# Patient Record
Sex: MALE | URBAN - METROPOLITAN AREA
[De-identification: names, ages, dates, MRNs, and addresses within clinical notes are randomized per-mention and may not be internally consistent; named-entity substitution may affect disease eponyms.]

---

## 2017-11-16 ENCOUNTER — TRANSFERRED RECORDS (OUTPATIENT)
Dept: HEALTH INFORMATION MANAGEMENT | Facility: CLINIC | Age: 37
End: 2017-11-16

## 2017-12-06 ENCOUNTER — TRANSFERRED RECORDS (OUTPATIENT)
Dept: HEALTH INFORMATION MANAGEMENT | Facility: CLINIC | Age: 37
End: 2017-12-06

## 2017-12-15 ENCOUNTER — TRANSFERRED RECORDS (OUTPATIENT)
Dept: HEALTH INFORMATION MANAGEMENT | Facility: CLINIC | Age: 37
End: 2017-12-15

## 2018-01-05 ENCOUNTER — TRANSFERRED RECORDS (OUTPATIENT)
Dept: HEALTH INFORMATION MANAGEMENT | Facility: CLINIC | Age: 38
End: 2018-01-05

## 2018-01-11 ENCOUNTER — TRANSFERRED RECORDS (OUTPATIENT)
Dept: HEALTH INFORMATION MANAGEMENT | Facility: CLINIC | Age: 38
End: 2018-01-11

## 2018-01-23 PROCEDURE — 00000346 ZZHCL STATISTIC REVIEW OUTSIDE SLIDES TC 88321: Performed by: INTERNAL MEDICINE

## 2018-01-23 PROCEDURE — 00000345 ZZHCL STATISTIC REV BONE MARROW OUTSIDE SLIDES TC 88321: Performed by: INTERNAL MEDICINE

## 2018-01-25 LAB — COPATH REPORT: NORMAL

## 2018-01-26 LAB — COPATH REPORT: NORMAL

## 2018-01-31 ENCOUNTER — OFFICE VISIT (OUTPATIENT)
Dept: TRANSPLANT | Facility: CLINIC | Age: 38
End: 2018-01-31
Attending: INTERNAL MEDICINE
Payer: COMMERCIAL

## 2018-01-31 VITALS
HEART RATE: 78 BPM | WEIGHT: 237.6 LBS | SYSTOLIC BLOOD PRESSURE: 131 MMHG | DIASTOLIC BLOOD PRESSURE: 79 MMHG | RESPIRATION RATE: 18 BRPM | OXYGEN SATURATION: 98 %

## 2018-01-31 DIAGNOSIS — D47.02 INDOLENT SYSTEMIC MASTOCYTOSIS: Primary | ICD-10-CM

## 2018-01-31 PROCEDURE — G0463 HOSPITAL OUTPT CLINIC VISIT: HCPCS

## 2018-01-31 RX ORDER — LORAZEPAM 0.5 MG/1
0.5 TABLET ORAL
COMMUNITY
Start: 2017-12-29

## 2018-01-31 RX ORDER — EPINEPHRINE 0.3 MG/.3ML
0.3 INJECTION SUBCUTANEOUS
COMMUNITY
Start: 2018-01-08

## 2018-01-31 ASSESSMENT — PAIN SCALES - GENERAL: PAINLEVEL: NO PAIN (0)

## 2018-01-31 NOTE — NURSING NOTE
Oncology Rooming Note    January 31, 2018 2:22 PM   Aldair Gorman is a 37 year old male who presents for:    Chief Complaint   Patient presents with     Oncology Clinic Visit     Pt is here for Mastocytosis-here for Eval.     Initial Vitals: /79 (BP Location: Right arm, Patient Position: Right side, Cuff Size: Adult Large)  Pulse 78  Resp 18  Wt 107.8 kg (237 lb 9.6 oz)  SpO2 98% There is no height or weight on file to calculate BMI. There is no height or weight on file to calculate BSA.  No Pain (0) Comment: Data Unavailable   No LMP for male patient.  Allergies reviewed: Yes  Medications reviewed: Yes    Medications: Medication refills not needed today.  Pharmacy name entered into EPIC: Data Unavailable    Clinical concerns: none     6 minutes for nursing intake (face to face time)     Vilma Holman MA

## 2018-01-31 NOTE — PROGRESS NOTES
He came today with his wife who is referred by Dr. Elias for a second opinion for systemic mastocytosis.      HISTORY OF PRESENT ILLNESS:  Mr. Gorman is a very pleasant person who had abdominal pain somewhat related to food which prompted some imaging showing that he had gallbladder stones and he underwent surgery, cholecystectomy at the same time.  They noticed that he had skin lesions in the flanks as well as upper thighs.  Although it was noticed and prompted a Dermatology consult and biopsy last year, he has had these lesions for 15 years.  He notes that it does not disappear but the density does not increase significantly over time either.      The other symptom that has bothered him is migrating arthralgias, perhaps arthritis because some redness comes.  It is randomly occurring, no association with foods or any outside insult that he can tell, but it starts from his wrist and goes to knee and to the toes.  Sometimes it happens 3 episodes in a month.  Sometimes he has no episodes for 3-6 months.      He denies any allergic reactions, although he notes severe nausea after morphine.  He denies any episodes of anaphylaxis and no anaphylactoid reactions.      He denied nausea, vomiting, diarrhea, constipation, fevers, bone pain, chest pain and tachycardia.      PAST MEDICAL HISTORY:  Positive for cholelithiasis status post cholecystectomy, right upper superficial vein cauterizations.      SOCIAL HISTORY:  He is .  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  He denies alcohol, ETOH abuse.      FAMILY HISTORY:  He has 1 sister.  His family has hypertension and an uncle had pancreatic cancer.      PHYSICAL EXAMINATION:   GENERAL:  He is alert, oriented, in no acute distress.   HEENT:  Sclerae were anicteric.  Conjunctivae were not pale.   LUNGS:  Sounds were clear to auscultation.   HEART:  Sounds were regular and rhythmic.   ABDOMEN:  Soft.  Not tender.    EXTREMITIES:  There was no pretibial edema.  No arthritis at this point.     SKIN:  He has a significant rash consistent with urticaria pigmentosa in the flanks and he notes in the upper thighs.  I did look at the upper thighs in this visit,      LABORATORY DATA:  I did not order any labs today, but there were labs done on 12/06/2017, white blood cell 7.4, hemoglobin 15.3, platelets 260,000.  Neutrophils 64%, lymphocytes 23%, monocytes 8.6, eosinophils 2.5, basophils 0.8%.  Sodium 140, potassium 4.1, bicarbonate is 25, BUN 11, creatinine 0.8, calcium 9.8, glucose 92, total protein 7.6, albumin 4, alkaline phosphatase is 114.  AST and ALT are normal 20, 16, total bilirubin 1.3,  tryptase 69.        Bone marrow biopsy done and also was seen by my hematopathology here and read as normal cellular bone marrow 60%, systemic mastocytosis occupying 10-15% of the marrow, mast cells shows aberrant CD25 expiration, no evidence of dysplasia, lymphoma, or other cancer.  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  His karyotype was 46, XY.  He did have also a skin biopsy.  The skin biopsies showed an increased number of abnormal mast cells perivascularly and diffusing the papillary dermis, occasional perivascular eosinophils are also appreciated.  Properly controlled immunostains were performed,  stain highlights the mast cells, CD2 and 25 appear to highlight background T-cells.  No aberrant expressions.      ASSESSMENT AND PLAN:  I discussed that he has systemic mastocytosis by WHO definition KIT mutation positivity mast cell aggregates and aberrant CD2 positivity fulfills the criteria.      I explained that systemic mastocytosis in him, also stated that with cutaneous mastocytosis, urticaria pigmentosa is expected.      The most important thing after diagnosis of systemic mastocytosis is to differ if the patient has indolent or aggressive systemic mastocytosis.  In terms of  advanced systemic mastocytosis, patients can have aggressive systemic mastocytosis, SM, with associated hematologic neoplasm or mast cell leukemia.  Given findings (organ dysfunction evidence), it is very important to differ patients with aggressive mastocytosis from indolent mastocytosis.  Apparently he does not have any C findings at this stage (no liver abnormalities, pancytopenia, malabsorption, hypoalbuminemia, GI tract problems, or portal hypertension as far as we can tell).  This is very consistent with how healthy he is, so I reassured them that this is an indolent disease and will not cause shortening in his life expectancy.  However, he may have mast cell granular release syndromes, which can be allergic reactions and anaphylaxis.  It also can affect his bones, so I recommended him additional:    1. Bone DEXA scan.   2.  Bone survey to make sure that he has lytic or sclerotic lesions.   3.  Magnetic resonance elastogram of liver.  I explained the rational of disease that sometimes patients can have normal liver function tests; however, their liver can be infiltrated by mast cells and in particularly they may have fibrosis.  An MR elastogram is a new method of looking at liver fibrosis in these patients.      In summary, he has indolent systemic mastocytosis.  The additional bone and liver tests done, he should be followed every 3-6 months with a CBC, every 2 years or so a bone marrow biopsies.  Again the most important thing is to reassure him that this is an indolent disease and there is no therapy to intervene mast cell growth at this stage because of those medications cause more problems than without therapy.  However, the research has been improving in these patient, it is possible that in the coming years we will have studies and in fact maybe therapies for indolent SM patients as well.      I did not recommend him to get therapy for cutaneous mastocytosis.  However, with cutaneous lesions, I told him  that his disease will worsen with time because we are not treating it.      Anthistemis can be used, in particular I am curious if he used Zantac and Claritin together if he has arthralgias if this is caused by mast cell degranulation, although it is unusual, so he needs to make sure that the migrating arthralgia is not caused by another autoimmune disorder.      It is very important for him to carry an EpiPen all the time because anaphylactic reactions can occur.  Therefore, I recommended him to keep his Allergy appointments.        cc:  Faith Elias MD.

## 2018-01-31 NOTE — MR AVS SNAPSHOT
After Visit Summary   1/31/2018    Aldair Gorman    MRN: [id number]           Patient Information     Date Of Birth          1980        Visit Information        Provider Department      1/31/2018 2:00 PM Ok Rico MD [facility] Blood and Marrow Transplant        Today's Diagnoses     Indolent systemic mastocytosis    -  1          [facility] ([facility] [place])  [address] [place] [id number]  Phone: [phone]  Clinic Hours:   Monday-Thursday:7am to 7pm   Friday: 7am to 5pm   Weekends and holidays:    8am to noon (in general)  If your fever is 100.5  or greater,   call the clinic.  After hours call the   hospital at [phone] or   [phone]. Ask for the BMT   fellow on-call            Follow-ups after your visit        Who to contact     If you have questions or need follow up information about today's clinic visit or your schedule please contact [facility] directly at [phone].  Normal or non-critical lab and imaging results will be communicated to you by [employer], letter or phone within 4 business days after the clinic has received the results. If you do not hear from us within 7 days, please contact the clinic through [employer] or phone. If you have a critical or abnormal lab result, we will notify you by phone as soon as possible.  Submit refill requests through [employer] or call your pharmacy and they will forward the refill request to us. Please allow 3 business days for your refill to be completed.          Additional Information About Your Visit        [employer] Information     [employer] gives you secure access to your electronic health record. If you see a primary care provider, you can also send messages to your care team and make appointments. If you have questions, please call your primary care clinic.  If you do not have a primary care provider, please call [phone] and they will assist you.        Care EveryWhere ID     This  is your Care EveryWhere ID. This could be used by other organizations to access your [place] medical records  [id number]        Your Vitals Were     Pulse Respirations Pulse Oximetry             78 18 98%          Blood Pressure from Last 3 Encounters:   01/31/18 131/79    Weight from Last 3 Encounters:   01/31/18 107.8 kg (237 lb 9.6 oz)              Today, you had the following     No orders found for display       Recent Review Flowsheet Data     There is no flowsheet data to display.               Primary Care Provider Office Phone # Fax #    Tee Tello [phone]       [address] [place] [address]        Equal Access to Services     [facility] : Hadii miguel milligan hadasho Socecilia, waaxda luqadaha, qaybta kaalmada adeegyada, leslie contreras . So [facility] [phone].    ATENCIÓN: Si habla español, tiene a beasley disposición servicios gratuitos de asistencia lingüística. Llame al [phone].    We comply with applicable federal civil rights laws and [place] laws. We do not discriminate on the basis of race, color, national origin, age, disability, sex, sexual orientation, or gender identity.            Thank you!     Thank you for choosing [facility]  for your care. Our goal is always to provide you with excellent care. Hearing back from our patients is one way we can continue to improve our services. Please take a few minutes to complete the written survey that you may receive in the mail after your visit with us. Thank you!             Your Updated Medication List - Protect others around you: Learn how to safely use, store and throw away your medicines at www.disposemymeds.org.          This list is accurate as of 1/31/18 11:59 PM.  Always use your most recent med list.                   Brand Name Dispense Instructions for use Diagnosis    Coenzyme Q10 30 MG Caps           EPINEPHrine 0.3 MG/0.3ML injection  2-pack    EPIPEN/ADRENACLICK/or ANY BX GENERIC EQUIV     Inject 0.3 mg into the muscle        LORazepam 0.5 MG tablet    ATIVAN     Take 0.5 mg by mouth

## 2018-03-21 ENCOUNTER — RADIANT APPOINTMENT (OUTPATIENT)
Dept: GENERAL RADIOLOGY | Facility: CLINIC | Age: 38
End: 2018-03-21
Attending: INTERNAL MEDICINE
Payer: COMMERCIAL

## 2018-03-21 ENCOUNTER — RADIANT APPOINTMENT (OUTPATIENT)
Dept: BONE DENSITY | Facility: CLINIC | Age: 38
End: 2018-03-21
Attending: INTERNAL MEDICINE
Payer: COMMERCIAL

## 2018-03-21 ENCOUNTER — RADIANT APPOINTMENT (OUTPATIENT)
Dept: MRI IMAGING | Facility: CLINIC | Age: 38
End: 2018-03-21
Attending: INTERNAL MEDICINE
Payer: COMMERCIAL

## 2018-03-21 DIAGNOSIS — D47.02 INDOLENT SYSTEMIC MASTOCYTOSIS: ICD-10-CM

## 2020-03-11 ENCOUNTER — HEALTH MAINTENANCE LETTER (OUTPATIENT)
Age: 40
End: 2020-03-11

## 2021-01-03 ENCOUNTER — HEALTH MAINTENANCE LETTER (OUTPATIENT)
Age: 41
End: 2021-01-03

## 2021-04-25 ENCOUNTER — HEALTH MAINTENANCE LETTER (OUTPATIENT)
Age: 41
End: 2021-04-25

## 2021-10-10 ENCOUNTER — HEALTH MAINTENANCE LETTER (OUTPATIENT)
Age: 41
End: 2021-10-10

## 2022-05-21 ENCOUNTER — HEALTH MAINTENANCE LETTER (OUTPATIENT)
Age: 42
End: 2022-05-21

## 2022-09-17 ENCOUNTER — HEALTH MAINTENANCE LETTER (OUTPATIENT)
Age: 42
End: 2022-09-17

## 2023-06-04 ENCOUNTER — HEALTH MAINTENANCE LETTER (OUTPATIENT)
Age: 43
End: 2023-06-04